# Patient Record
Sex: FEMALE | URBAN - METROPOLITAN AREA
[De-identification: names, ages, dates, MRNs, and addresses within clinical notes are randomized per-mention and may not be internally consistent; named-entity substitution may affect disease eponyms.]

---

## 2022-03-07 ENCOUNTER — APPOINTMENT (RX ONLY)
Dept: URBAN - METROPOLITAN AREA CLINIC 158 | Facility: CLINIC | Age: 72
Setting detail: DERMATOLOGY
End: 2022-03-07

## 2022-03-07 DIAGNOSIS — B35.1 TINEA UNGUIUM: ICD-10-CM

## 2022-03-07 PROCEDURE — ? PATIENT SPECIFIC COUNSELING

## 2022-03-07 PROCEDURE — 99202 OFFICE O/P NEW SF 15 MIN: CPT

## 2022-03-07 PROCEDURE — ? COUNSELING

## 2022-03-07 ASSESSMENT — LOCATION ZONE DERM: LOCATION ZONE: FINGERNAIL

## 2022-03-07 ASSESSMENT — LOCATION SIMPLE DESCRIPTION DERM: LOCATION SIMPLE: RIGHT THUMBNAIL

## 2022-03-07 ASSESSMENT — LOCATION DETAILED DESCRIPTION DERM: LOCATION DETAILED: RIGHT THUMBNAIL

## 2022-03-07 NOTE — PROCEDURE: PATIENT SPECIFIC COUNSELING
Patient was given oral terbinafine 250mg QD by her PCP, Dr. Marv Carroll, that she started on 2/9/2022.  She was given a 90-day course. Condition has improved  compared to photo evaluated on patient cell phone.  Photo actually shows changes consistent with concomitant pseudomonas nail infection with the onychomycosis.  I instructed her to finish off the terbinafine as prescribed. I also OTC DermaNail Nail conditioner that can be obtain at [email or web address].
Detail Level: Detailed